# Patient Record
Sex: MALE | Race: WHITE | NOT HISPANIC OR LATINO | Employment: OTHER | URBAN - METROPOLITAN AREA
[De-identification: names, ages, dates, MRNs, and addresses within clinical notes are randomized per-mention and may not be internally consistent; named-entity substitution may affect disease eponyms.]

---

## 2024-04-01 ENCOUNTER — HOSPITAL ENCOUNTER (OUTPATIENT)
Dept: CARDIOLOGY | Facility: CLINIC | Age: 79
Discharge: HOME | End: 2024-04-01
Payer: MEDICARE

## 2024-04-01 DIAGNOSIS — Z95.0 PRESENCE OF CARDIAC PACEMAKER: ICD-10-CM

## 2024-04-01 DIAGNOSIS — I48.91 UNSPECIFIED ATRIAL FIBRILLATION (MULTI): ICD-10-CM

## 2024-04-01 DIAGNOSIS — I48.92 UNSPECIFIED ATRIAL FLUTTER (MULTI): ICD-10-CM

## 2024-04-01 PROCEDURE — 93296 REM INTERROG EVL PM/IDS: CPT

## 2024-11-04 ENCOUNTER — APPOINTMENT (OUTPATIENT)
Dept: CARDIOLOGY | Facility: HOSPITAL | Age: 79
End: 2024-11-04
Payer: MEDICARE

## 2024-12-02 ENCOUNTER — APPOINTMENT (OUTPATIENT)
Dept: CARDIOLOGY | Facility: HOSPITAL | Age: 79
End: 2024-12-02
Payer: MEDICARE

## 2024-12-03 ENCOUNTER — DOCUMENTATION (OUTPATIENT)
Dept: PHYSICAL THERAPY | Facility: HOSPITAL | Age: 79
End: 2024-12-03
Payer: MEDICARE

## 2024-12-03 ENCOUNTER — APPOINTMENT (OUTPATIENT)
Dept: PHYSICAL THERAPY | Facility: HOSPITAL | Age: 79
End: 2024-12-03
Payer: MEDICARE

## 2024-12-03 NOTE — PROGRESS NOTES
Physical Therapy                 Therapy Communication Note    Patient Name: Seferino Blackwood  MRN: [id number]  Department:   Room: Room/bed info not found  Today's Date: 12/3/2024     Discipline: Physical Therapy    Missed Visit Reason:      Missed Time: Cancel    Comment:

## 2024-12-05 ENCOUNTER — APPOINTMENT (OUTPATIENT)
Dept: PHYSICAL THERAPY | Facility: HOSPITAL | Age: 79
End: 2024-12-05
Payer: MEDICARE

## 2024-12-10 ENCOUNTER — EVALUATION (OUTPATIENT)
Dept: PHYSICAL THERAPY | Facility: HOSPITAL | Age: 79
End: 2024-12-10
Payer: OTHER GOVERNMENT

## 2024-12-10 DIAGNOSIS — R26.89 OTHER ABNORMALITIES OF GAIT AND MOBILITY: ICD-10-CM

## 2024-12-10 DIAGNOSIS — R53.1 WEAKNESS GENERALIZED: Primary | ICD-10-CM

## 2024-12-10 PROCEDURE — 97162 PT EVAL MOD COMPLEX 30 MIN: CPT | Mod: GP

## 2024-12-10 ASSESSMENT — PAIN SCALES - GENERAL: PAINLEVEL_OUTOF10: 0 - NO PAIN

## 2024-12-10 ASSESSMENT — ENCOUNTER SYMPTOMS
LOSS OF SENSATION IN FEET: 0
DEPRESSION: 0
OCCASIONAL FEELINGS OF UNSTEADINESS: 1

## 2024-12-10 ASSESSMENT — PATIENT HEALTH QUESTIONNAIRE - PHQ9
2. FEELING DOWN, DEPRESSED OR HOPELESS: NOT AT ALL
SUM OF ALL RESPONSES TO PHQ9 QUESTIONS 1 AND 2: 0
1. LITTLE INTEREST OR PLEASURE IN DOING THINGS: NOT AT ALL

## 2024-12-10 ASSESSMENT — COLUMBIA-SUICIDE SEVERITY RATING SCALE - C-SSRS
2. HAVE YOU ACTUALLY HAD ANY THOUGHTS OF KILLING YOURSELF?: NO
1. IN THE PAST MONTH, HAVE YOU WISHED YOU WERE DEAD OR WISHED YOU COULD GO TO SLEEP AND NOT WAKE UP?: NO
6. HAVE YOU EVER DONE ANYTHING, STARTED TO DO ANYTHING, OR PREPARED TO DO ANYTHING TO END YOUR LIFE?: NO

## 2024-12-10 ASSESSMENT — PAIN - FUNCTIONAL ASSESSMENT: PAIN_FUNCTIONAL_ASSESSMENT: 0-10

## 2024-12-10 NOTE — PROGRESS NOTES
Physical Therapy  Physical Therapy Evaluation    Patient Name: Seferino Blackwood  MRN: [id number]  Encounter Date: 12/10/2024  Time Calculation  Start Time: 1345  Stop Time: 1430  Time Calculation (min): 45 min    PT Evaluation Time Entry  PT Evaluation (Moderate) Time Entry: 45     Insurance:  Visit number: 1 of 15  Authorization info: 15 approved 11/21/2024-4/2/2025  Payor: [facility] HEALTH / Plan: [facility] / Product Type: *No Product type* /     Current Problem  Problem List Items Addressed This Visit             ICD-10-CM    Weakness generalized - Primary R53.1     Other Visit Diagnoses         Codes    Other abnormalities of gait and mobility     R26.89          1. Weakness generalized        2. Other abnormalities of gait and mobility  Referral to Physical Therapy        General:  General  Reason for Referral: Eval and treat 2/2 post acute care hospitalization weakness.  Referred By: Maryuri Castellanos    Precautions:   Precautions  STEADI Fall Risk Score (The score of 4 or more indicates an increased risk of falling): 4  Medical Precautions: No known precautions/limitation    Medical History Form: Reviewed (scanned into chart)    Subjective:   Subjective   Chief Complaint: Patient presents to clinic with deconditioning of lengthy hospitalization.  Onset Date: 11/8/2024  FIDENCIO: Post hospitalization deconditioning.    Current Condition:   Same    Pain:  Pain Assessment: 0-10  0-10 (Numeric) Pain Score: 0 - No pain    Relevant Information (PMH & Previous Tests/Imaging): No  Previous Interventions/Treatments: None    Prior Level of Function (PLOF)  Patient previously independent with all ADLs  Exercise/Physical Activity: likes to walk  Work/School: retired  Hobbies: not mentioned.    Patients Living Environment: Reviewed and no concern    Primary Language: English    Patient's Goal(s) for Therapy: Get strength back.    Red Flags: Do you have any of the following?  No  Fever/chills, unexplained weight changes, dizziness/fainting, unexplained change in bowel or bladder functions, unexplained malaise or muscle weakness, night pain/sweats, numbness or tingling    Objective:  Objective         ROM    Hip AROM (Degrees)      (R)  (L)  Flexion: 120  120   Extension: 10  8    Abduction: 18  20        Knee AROM (Degrees)    WFL       Strength Testing    Core/Abdominals: poor    Hip      (R)  (L)  Flexion: 4/5  4/5     Extension: 3+/5  3+/5    Abduction: 3+/5  3+/5      Knee    (R)  (L)  Flexion: 4/5  4/5      Extension: 4/5  4/5     Ankle    (R)  (L)  Plantarflexion: 4/5  4/5      Dorsiflexion: 4/5  4/5              Functional Screening    Lower Extremity Functional Movements  Transfers: Modified Independent  Gait: none  Assistive Device: no device  DL Squat: to standard chair height  Balance  Feet Together: >20 sec  SLS: <5 sec bilaterally    Flexibility: mild HS tightness      Posture: decreased lumbar lordosis    Outcome Measures:  AMPAC: 40     EDUCATION:   Individual(s) Educated: Patient  Education Provided: Home exercise program, plan of care, activity modifications, pain management, and injury pathology  Handout(s) Provided: Scanned into chart  Home Program: See below treatment  Risk and Benefits Discussed with Patient/Caregiver/Other: Yes   Patient/Caregiver Demonstrated Understanding: Yes   Plan of Care Discussed and Agreed Upon: Yes   Patient Response to Education: Patient/Caregiver verbalized understanding of information    Assessment: Patient presents with signs and symptoms consistent with deconditioning, resulting in limited participation in pain-free ADLs and inability to perform at their prior level of function. Skilled PT warranted to address the above stated impairments, so the patient can perform FA's without increased pain or difficulty.    Clinical Presentation: Stable and/or uncomplicated characteristics    Complexity: low    Plan:  Treatment/Interventions:  Education/ Instruction, Gait training, Neuromuscular re-education, Therapeutic activities, Therapeutic exercises  PT Plan: Skilled PT  PT Frequency: 2 times per week  Duration: 7 wks  Certification Period Start Date: 11/21/24  Certification Period End Date: 04/02/25  Number of Treatments Authorized: 15  Rehab Potential: Good  Plan of Care Agreement: Patient    Goals: Set and discussed today  Active       PT Problem       The patient will improve their performance in the 5 time sit to stand test to < 15 to demonstrate increased balance and LE strength required for safe functional transfers and gait.        Start:  12/10/24    Expected End:  01/28/25            The patient will decrease their time in the timed up and go to <10 seconds to demonstrate improved balance and functional ambulatory ability.        Start:  12/10/24    Expected End:  01/28/25            Increase hip girdle strength to >=4/5       Start:  12/10/24    Expected End:  01/28/25            The patient will demonstrate full understanding and competent performance of their home exercise program to maintain or potentially further improve their presenting condition.        Start:  12/10/24    Expected End:  01/28/25            Improve AMPAC by >10 points       Start:  12/10/24    Expected End:  01/28/25            Patient Stated Goal 1       Start:  12/10/24    Expected End:  01/28/25       Climb steps better             Plan of care was developed with input and agreement by the patient      Treatment Performed: Instructed in the following HEP activities.    Access Code: [email or web address]  URL: [email or web address]  Date: 12/10/2024  Prepared by: Bassam Lloyd    Exercises  - Supine Bridge  - 1 x daily - 7 x weekly - 3 sets - 10 reps  - Sidelying Hip Abduction  - 1 x daily - 7 x weekly - 3 sets - 10 reps  - Prone Hip Extension  - 1 x daily - 7 x weekly - 3 sets - 10 reps  - Squat with Chair Touch  - 1 x daily - 7 x weekly - 3 sets - 4  jessica Lloyd, PT        Screening  Frequency  Date Last Completed   Spiritual and Cultural Beliefs   Screening  each visit or episode of care 12/10/2024   Falls Risk Screening  every ambulatory visit 12/10/2024  2:04 PM   Pain Screening  annually at primary care visit     Domestic Violence screening  annually at primary care visit    Elder Abuse Screening  annually at primary care visit    Depression Screening  annually in the primary care setting 12/10/2024   Suicide Risk Screening  annually in the primary care setting 12/10/2024   Nutrition and Food Insecurity   Screening  at least annually at primary care visit     Key Learner  annually in the primary care setting 12/10/2024   Drug Screen     Alcohol Screen     Advance Directive

## 2024-12-10 NOTE — LETTER
December 10, 2024    Maryuri Castellanos DO  [address]  [place] [address]    Patient: Seferino Blackwood   YOB: 1945   Date of Visit: 12/10/2024       Dear Maryuri Castellanos DO  [address] [place] [address]    The attached plan of care is being sent to you because your patient’s medical reimbursement requires that you certify the plan of care. Your signature is required to allow uninterrupted insurance coverage.      You may indicate your approval by signing below and faxing this form back to us at Dept Fax: [phone].    Please call Dept: [phone] with any questions or concerns.    Thank you for this referral,        Bassam Lloyd PT  [facility]  [address] [id number] [phone]    Payer: [facility] / Plan: [facility] NETWORK / Product Type: *No Product type* /                                                                         Date:     Dear Bassam Lloyd PT,     Re: Mr. Seferino Blackwood, MRN:[id number]    I certify that I have reviewed the attached plan of care and it is medically necessary for Mr. Seferino Blackwood (1945) who is under my care.          ______________________________________                    _________________  Provider name and credentials                                           Date and time                                                                                           Plan of Care 12/10/24   Effective from: 12/10/2024  Effective to: 1/28/2025    Plan ID: [id number]            Participants as of Finalize on 12/10/2024    Name Type Comments Contact Info    Maryuri Castellanos DO Referring Provider  [phone]    Bassam Lloyd PT Physical Therapist  [phone]       Last Plan Note     Author: Bassam Lloyd PT Status: Incomplete Last edited: 12/10/2024  1:45 PM         Physical Therapy  Physical Therapy  Evaluation    Patient Name: Seferino Blackwood  MRN: [id number]  Encounter Date: 12/10/2024  Time Calculation  Start Time: 1345  Stop Time: 1430  Time Calculation (min): 45 min    PT Evaluation Time Entry  PT Evaluation (Moderate) Time Entry: 45     Insurance:  Visit number: 1 of 15  Authorization info: 15 approved 11/21/2024-4/2/2025  [facility] / Plan: [facility] / Product Type: *No Product type* /     Current Problem  Problem List Items Addressed This Visit             ICD-10-CM    Weakness generalized - Primary R53.1     Other Visit Diagnoses         Codes    Other abnormalities of gait and mobility     R26.89          1. Weakness generalized        2. Other abnormalities of gait and mobility  Referral to Physical Therapy        General:  General  Reason for Referral: Eval and treat 2/2 post acute care hospitalization weakness.  Referred By: Maryuri Castellanos    Precautions:   Precautions  STEADI Fall Risk Score (The score of 4 or more indicates an increased risk of falling): 4  Medical Precautions: No known precautions/limitation    Medical History Form: Reviewed (scanned into chart)    Subjective:   Subjective  Chief Complaint: Patient presents to clinic with deconditioning of lengthy hospitalization.  Onset Date: 11/8/2024  FIDENCIO: Post hospitalization deconditioning.    Current Condition:   Same    Pain:  Pain Assessment: 0-10  0-10 (Numeric) Pain Score: 0 - No pain    Relevant Information (PMH & Previous Tests/Imaging): No  Previous Interventions/Treatments: None    Prior Level of Function (PLOF)  Patient previously independent with all ADLs  Exercise/Physical Activity: likes to walk  Work/School: retired  Hobbies: not mentioned.    Patients Living Environment: Reviewed and no concern    Primary Language: English    Patient's Goal(s) for Therapy: Get strength back.    Red Flags: Do you have any of the following? No  Fever/chills, unexplained weight changes,  dizziness/fainting, unexplained change in bowel or bladder functions, unexplained malaise or muscle weakness, night pain/sweats, numbness or tingling    Objective:  Objective        ROM    Hip AROM (Degrees)      (R)  (L)  Flexion: 120  120   Extension: 10  8    Abduction: 18  20        Knee AROM (Degrees)    WFL       Strength Testing    Core/Abdominals: poor    Hip      (R)  (L)  Flexion: 4/5  4/5     Extension: 3+/5  3+/5    Abduction: 3+/5  3+/5      Knee    (R)  (L)  Flexion: 4/5  4/5      Extension: 4/5  4/5     Ankle    (R)  (L)  Plantarflexion: 4/5  4/5      Dorsiflexion: 4/5  4/5              Functional Screening    Lower Extremity Functional Movements  Transfers: Modified Independent  Gait: none  Assistive Device: no device  DL Squat: to standard chair height  Balance  Feet Together: >20 sec  SLS: <5 sec bilaterally    Flexibility: mild HS tightness      Posture: decreased lumbar lordosis    Outcome Measures:  AMPAC: 40     EDUCATION:   Individual(s) Educated: Patient  Education Provided: Home exercise program, plan of care, activity modifications, pain management, and injury pathology  Handout(s) Provided: Scanned into chart  Home Program: See below treatment  Risk and Benefits Discussed with Patient/Caregiver/Other: Yes   Patient/Caregiver Demonstrated Understanding: Yes   Plan of Care Discussed and Agreed Upon: Yes   Patient Response to Education: Patient/Caregiver verbalized understanding of information    Assessment: Patient presents with signs and symptoms consistent with deconditioning, resulting in limited participation in pain-free ADLs and inability to perform at their prior level of function. Skilled PT warranted to address the above stated impairments, so the patient can perform FA's without increased pain or difficulty.    Clinical Presentation: Stable and/or uncomplicated characteristics    Complexity: low    Plan:  Treatment/Interventions: Education/ Instruction, Gait training, Neuromuscular  re-education, Therapeutic activities, Therapeutic exercises  PT Plan: Skilled PT  PT Frequency: 2 times per week  Duration: 7 wks  Certification Period Start Date: 11/21/24  Certification Period End Date: 04/02/25  Number of Treatments Authorized: 15  Rehab Potential: Good  Plan of Care Agreement: Patient    Goals: Set and discussed today  Active       PT Problem       The patient will improve their performance in the 5 time sit to stand test to < 15 to demonstrate increased balance and LE strength required for safe functional transfers and gait.        Start:  12/10/24    Expected End:  01/28/25            The patient will decrease their time in the timed up and go to <10 seconds to demonstrate improved balance and functional ambulatory ability.        Start:  12/10/24    Expected End:  01/28/25            Increase hip girdle strength to >=4/5       Start:  12/10/24    Expected End:  01/28/25            The patient will demonstrate full understanding and competent performance of their home exercise program to maintain or potentially further improve their presenting condition.        Start:  12/10/24    Expected End:  01/28/25            Improve AMPAC by >10 points       Start:  12/10/24    Expected End:  01/28/25            Patient Stated Goal 1       Start:  12/10/24    Expected End:  01/28/25       Climb steps better             Plan of care was developed with input and agreement by the patient      Treatment Performed: Instructed in the following HEP activities.    Access Code: [email or web address]  URL: [email or web address]  Date: 12/10/2024  Prepared by: Bassam Lloyd    Exercises  - Supine Bridge  - 1 x daily - 7 x weekly - 3 sets - 10 reps  - Sidelying Hip Abduction  - 1 x daily - 7 x weekly - 3 sets - 10 reps  - Prone Hip Extension  - 1 x daily - 7 x weekly - 3 sets - 10 reps  - Squat with Chair Touch  - 1 x daily - 7 x weekly - 3 sets - 4 reps  Bassam Lloyd, PT        Screening  Frequency   Date Last Completed   Spiritual and Cultural Beliefs   Screening  each visit or episode of care 12/10/2024   Falls Risk Screening  every ambulatory visit 12/10/2024  2:04 PM   Pain Screening  annually at primary care visit     Domestic Violence screening  annually at primary care visit    Elder Abuse Screening  annually at primary care visit    Depression Screening  annually in the primary care setting 12/10/2024   Suicide Risk Screening  annually in the primary care setting 12/10/2024   Nutrition and Food Insecurity   Screening  at least annually at primary care visit     Key Learner  annually in the primary care setting 12/10/2024   Drug Screen     Alcohol Screen     Advance Directive                 Current Participants as of 12/10/2024    Name Type Comments Contact Info    Maryuri Castellanos DO Referring Provider  [phone]    Signature pending    Bassam Lloyd, PT Physical Therapist  [phone]    Electronically signed by Bassam Lloyd, PT at 12/10/2024 1641 EST

## 2024-12-10 NOTE — Clinical Note
December 10, 2024    Bassam Lloyd, PT  [address]  Rehab Services  [facility] [address]    Patient: Seferino lBackwood   YOB: 1945   Date of Visit: 12/10/2024       Dear Maryuri Castellanos DO  [address] [place] [address]    The attached plan of care is being sent to you because your patient’s medical reimbursement requires that you certify the plan of care. Your signature is required to allow uninterrupted insurance coverage.      You may indicate your approval by signing below and faxing this form back to us at Dept Fax: [phone].    Please call Dept: [phone] with any questions or concerns.    Thank you for this referral,        Bassam Lloyd PT  [facility]  [address] [facility] [address]  [phone]    Payer: Payor: [facility] HEALTH / Plan: [facility] / Product Type: *No Product type* /                                                                         Date:     Dear Bassam Lloyd PT,     Re: Mr. Seferino Blackwood, MRN:[id number]    I certify that I have reviewed the attached plan of care and it is medically necessary for Mr. Seefrino Blackwood (1945) who is under my care.          ______________________________________                    _________________  Provider name and credentials                                           Date and time                                                                                           Plan of Care 12/10/24   Effective from: 12/10/2024  Effective to: 1/28/2025    Plan ID: [id number]            Participants as of Finalize on 12/10/2024    Name Type Comments Contact Info    Maryuri Castellanos DO Referring Provider  [phone]    Bassam Lloyd PT Physical Therapist  [phone]       Last Plan Note     Author: Bassam Lloyd PT Status: Incomplete Last edited: 12/10/2024  1:45 PM         Physical Therapy  Physical Therapy  Evaluation    Patient Name: Seferino Blackwood  MRN: [id number]  Encounter Date: 12/10/2024  Time Calculation  Start Time: 1345  Stop Time: 1430  Time Calculation (min): 45 min    PT Evaluation Time Entry  PT Evaluation (Moderate) Time Entry: 45     Insurance:  Visit number: 1 of 15  Authorization info: 15 approved 11/21/2024-4/2/2025  [facility] / Plan: [facility] / Product Type: *No Product type* /     Current Problem  Problem List Items Addressed This Visit             ICD-10-CM    Weakness generalized - Primary R53.1     Other Visit Diagnoses         Codes    Other abnormalities of gait and mobility     R26.89          1. Weakness generalized        2. Other abnormalities of gait and mobility  Referral to Physical Therapy        General:  General  Reason for Referral: Eval and treat 2/2 post acute care hospitalization weakness.  Referred By: Maryuri Castellanos    Precautions:   Precautions  STEADI Fall Risk Score (The score of 4 or more indicates an increased risk of falling): 4  Medical Precautions: No known precautions/limitation    Medical History Form: Reviewed (scanned into chart)    Subjective:   Subjective  Chief Complaint: Patient presents to clinic with deconditioning of lengthy hospitalization.  Onset Date: 11/8/2024  FIDENCIO: Post hospitalization deconditioning.    Current Condition:   Same    Pain:  Pain Assessment: 0-10  0-10 (Numeric) Pain Score: 0 - No pain    Relevant Information (PMH & Previous Tests/Imaging): No  Previous Interventions/Treatments: None    Prior Level of Function (PLOF)  Patient previously independent with all ADLs  Exercise/Physical Activity: likes to walk  Work/School: retired  Hobbies: not mentioned.    Patients Living Environment: Reviewed and no concern    Primary Language: English    Patient's Goal(s) for Therapy: Get strength back.    Red Flags: Do you have any of the following? No  Fever/chills, unexplained weight changes,  dizziness/fainting, unexplained change in bowel or bladder functions, unexplained malaise or muscle weakness, night pain/sweats, numbness or tingling    Objective:  Objective        ROM    Hip AROM (Degrees)      (R)  (L)  Flexion: 120  120   Extension: 10  8    Abduction: 18  20        Knee AROM (Degrees)    WFL       Strength Testing    Core/Abdominals: poor    Hip      (R)  (L)  Flexion: 4/5  4/5     Extension: 3+/5  3+/5    Abduction: 3+/5  3+/5      Knee    (R)  (L)  Flexion: 4/5  4/5      Extension: 4/5  4/5     Ankle    (R)  (L)  Plantarflexion: 4/5  4/5      Dorsiflexion: 4/5  4/5              Functional Screening    Lower Extremity Functional Movements  Transfers: Modified Independent  Gait: none  Assistive Device: no device  DL Squat: to standard chair height  Balance  Feet Together: >20 sec  SLS: <5 sec bilaterally    Flexibility: mild HS tightness      Posture: decreased lumbar lordosis    Outcome Measures:  AMPAC: 40     EDUCATION:   Individual(s) Educated: Patient  Education Provided: Home exercise program, plan of care, activity modifications, pain management, and injury pathology  Handout(s) Provided: Scanned into chart  Home Program: See below treatment  Risk and Benefits Discussed with Patient/Caregiver/Other: Yes   Patient/Caregiver Demonstrated Understanding: Yes   Plan of Care Discussed and Agreed Upon: Yes   Patient Response to Education: Patient/Caregiver verbalized understanding of information    Assessment: Patient presents with signs and symptoms consistent with deconditioning, resulting in limited participation in pain-free ADLs and inability to perform at their prior level of function. Skilled PT warranted to address the above stated impairments, so the patient can perform FA's without increased pain or difficulty.    Clinical Presentation: Stable and/or uncomplicated characteristics    Complexity: low    Plan:  Treatment/Interventions: Education/ Instruction, Gait training, Neuromuscular  re-education, Therapeutic activities, Therapeutic exercises  PT Plan: Skilled PT  PT Frequency: 2 times per week  Duration: 7 wks  Certification Period Start Date: 11/21/24  Certification Period End Date: 04/02/25  Number of Treatments Authorized: 15  Rehab Potential: Good  Plan of Care Agreement: Patient    Goals: Set and discussed today  Active       PT Problem       The patient will improve their performance in the 5 time sit to stand test to < 15 to demonstrate increased balance and LE strength required for safe functional transfers and gait.        Start:  12/10/24    Expected End:  01/28/25            The patient will decrease their time in the timed up and go to <10 seconds to demonstrate improved balance and functional ambulatory ability.        Start:  12/10/24    Expected End:  01/28/25            Increase hip girdle strength to >=4/5       Start:  12/10/24    Expected End:  01/28/25            The patient will demonstrate full understanding and competent performance of their home exercise program to maintain or potentially further improve their presenting condition.        Start:  12/10/24    Expected End:  01/28/25            Improve AMPAC by >10 points       Start:  12/10/24    Expected End:  01/28/25            Patient Stated Goal 1       Start:  12/10/24    Expected End:  01/28/25       Climb steps better             Plan of care was developed with input and agreement by the patient      Treatment Performed: Instructed in the following HEP activities.    Access Code: [email or web address]  URL: [email or web address]  Date: 12/10/2024  Prepared by: Bassam Lloyd    Exercises  - Supine Bridge  - 1 x daily - 7 x weekly - 3 sets - 10 reps  - Sidelying Hip Abduction  - 1 x daily - 7 x weekly - 3 sets - 10 reps  - Prone Hip Extension  - 1 x daily - 7 x weekly - 3 sets - 10 reps  - Squat with Chair Touch  - 1 x daily - 7 x weekly - 3 sets - 4 reps  Bassam Lloyd, PT        Screening  Frequency   Date Last Completed   Spiritual and Cultural Beliefs   Screening  each visit or episode of care 12/10/2024   Falls Risk Screening  every ambulatory visit 12/10/2024  2:04 PM   Pain Screening  annually at primary care visit     Domestic Violence screening  annually at primary care visit    Elder Abuse Screening  annually at primary care visit    Depression Screening  annually in the primary care setting 12/10/2024   Suicide Risk Screening  annually in the primary care setting 12/10/2024   Nutrition and Food Insecurity   Screening  at least annually at primary care visit     Key Learner  annually in the primary care setting 12/10/2024   Drug Screen     Alcohol Screen     Advance Directive                 Current Participants as of 12/10/2024    Name Type Comments Contact Info    Maryuri Castellanos DO Referring Provider  [phone]    Signature pending    Bassam Lloyd, PT Physical Therapist  [phone]    Electronically signed by Bassam Lloyd, PT at 12/10/2024 1641 EST

## 2024-12-12 ENCOUNTER — TREATMENT (OUTPATIENT)
Dept: PHYSICAL THERAPY | Facility: HOSPITAL | Age: 79
End: 2024-12-12
Payer: OTHER GOVERNMENT

## 2024-12-12 DIAGNOSIS — R53.1 WEAKNESS GENERALIZED: ICD-10-CM

## 2024-12-12 PROCEDURE — 97110 THERAPEUTIC EXERCISES: CPT | Mod: GP

## 2024-12-12 ASSESSMENT — PAIN - FUNCTIONAL ASSESSMENT: PAIN_FUNCTIONAL_ASSESSMENT: 0-10

## 2024-12-12 ASSESSMENT — PAIN SCALES - GENERAL: PAINLEVEL_OUTOF10: 0 - NO PAIN

## 2024-12-12 NOTE — PROGRESS NOTES
Physical Therapy Treatment    Patient Name: Seferino Blackwood  MRN: [id number]  Today's Date: 12/12/2024  Payor: [facility] HEALTH / Plan: [facility] / Product Type: *No Product type* /   Time Calculation  Start Time: 1347  Stop Time: 1430  Time Calculation (min): 43 min      PT Therapeutic Procedures Time Entry  Therapeutic Exercise Time Entry: 43     Current Problem  1. Weakness generalized  Follow Up In Physical Therapy        Problem List Items Addressed This Visit             ICD-10-CM    Weakness generalized R53.1     General  Reason for Referral: Eval and treat 2/2 post acute care hospitalization weakness.  Referred By: Maryuri Castellanos    Subjective   Current Condition:   Same  Patient reports he has been working on HEP and is still finding it somewhat difficulty.    Performing HEP?: Yes    Precautions  Precautions  Medical Precautions: No known precautions/limitation    Pain  Pain Assessment: 0-10  0-10 (Numeric) Pain Score: 0 - No pain    Objective     Treatments:  Therapeutic Exercise  Therapeutic Exercise Performed: Yes  Therapeutic Exercise Activity 1: Physiostep x10 @ 20 W  Therapeutic Exercise Activity 2: Austin hip extension 3x10  Therapeutic Exercise Activity 3: Austin back extension 3x5  Therapeutic Exercise Activity 4: hip abductor [id number]  Therapeutic Exercise Activity 5: Calf raise in // bars 3x10  Therapeutic Exercise Activity 6: corner squat 3x5  Therapeutic Exercise Activity 7: TM 5 min @1.5    EDUCATION:   Individual(s) Educated: Patient  Education Provided: Discussed modifications to program to ensure progression.  Handout(s) Provided: Scanned into chart  Home Program: in place  Risk and Benefits Discussed with Patient/Caregiver/Other: Yes   Patient/Caregiver Demonstrated Understanding: Yes   Patient Response to Education: Patient/Caregiver verbalized understanding of information    Assessment: Tolerated advancing clinical program with strategic  rest periods.     Plan: Continue with POC. Progress as tolerated.  OP PT Plan  Treatment/Interventions: Education/ Instruction, Gait training, Neuromuscular re-education, Therapeutic activities, Therapeutic exercises  PT Plan: Skilled PT  PT Frequency: 2 times per week  Duration: 7 wks  Certification Period Start Date: 11/21/24  Certification Period End Date: 04/02/25  Number of Treatments Authorized: 2 of15  Rehab Potential: Good  Plan of Care Agreement: Patient    Goals:  Active       PT Problem       The patient will improve their performance in the 5 time sit to stand test to < 15 to demonstrate increased balance and LE strength required for safe functional transfers and gait.        Start:  12/10/24    Expected End:  01/28/25            The patient will decrease their time in the timed up and go to <10 seconds to demonstrate improved balance and functional ambulatory ability.        Start:  12/10/24    Expected End:  01/28/25            Increase hip girdle strength to >=4/5       Start:  12/10/24    Expected End:  01/28/25            The patient will demonstrate full understanding and competent performance of their home exercise program to maintain or potentially further improve their presenting condition.        Start:  12/10/24    Expected End:  01/28/25            Improve AMPAC by >10 points       Start:  12/10/24    Expected End:  01/28/25            Patient Stated Goal 1       Start:  12/10/24    Expected End:  01/28/25       Climb steps better              Bassam Lloyd, PT

## 2024-12-17 ENCOUNTER — APPOINTMENT (OUTPATIENT)
Dept: PHYSICAL THERAPY | Facility: HOSPITAL | Age: 79
End: 2024-12-17
Payer: OTHER GOVERNMENT

## 2024-12-19 ENCOUNTER — TREATMENT (OUTPATIENT)
Dept: PHYSICAL THERAPY | Facility: HOSPITAL | Age: 79
End: 2024-12-19
Payer: OTHER GOVERNMENT

## 2024-12-19 DIAGNOSIS — R53.1 WEAKNESS GENERALIZED: ICD-10-CM

## 2024-12-19 PROCEDURE — 97110 THERAPEUTIC EXERCISES: CPT | Mod: GP,CQ

## 2024-12-19 ASSESSMENT — PAIN - FUNCTIONAL ASSESSMENT: PAIN_FUNCTIONAL_ASSESSMENT: 0-10

## 2024-12-19 ASSESSMENT — PAIN SCALES - GENERAL: PAINLEVEL_OUTOF10: 0 - NO PAIN

## 2024-12-19 NOTE — PROGRESS NOTES
Physical Therapy Treatment    Patient Name: Seferino Blackwood  MRN: [id number]  Encounter Date: 12/19/2024  Time Calculation  Start Time: 1340  Stop Time: 1427  Time Calculation (min): 47 min        PT Therapeutic Procedures Time Entry  Therapeutic Exercise Time Entry: 47     Current Problem  Problem List Items Addressed This Visit             ICD-10-CM    Weakness generalized R53.1     1. Weakness generalized  Follow Up In Physical Therapy          General  Reason for Referral: Generalized weakness  Referred By: Maryuri Castellanos  Past Medical History Relevant to Rehab: see chart  Preferred Learning Style: written  General Comment: Pt reports feeling good today, no pain just cont to feel weak and overall struggling with activities up on feet or up and down steps.    Subjective   Current Condition:   Same  Patient reports feeling good overall, however cont to report weakness and difficulty with longer distance gait and up and down steps.  Reports able to stand and take shower with less SOB and reduced seated rest breaks needed.    Performing HEP?: Yes    Precautions  Precautions  Medical Precautions: No known precautions/limitation, Fall precautions  Pain  Pain Assessment: 0-10  0-10 (Numeric) Pain Score: 0 - No pain    Objective     Treatments:    Therapeutic Exercise  Therapeutic Exercise Performed: Yes  Therapeutic Exercise Activity 1: Physiostep x10 @ 20 W  Therapeutic Exercise Activity 2: Austin hip extension 3x10  Therapeutic Exercise Activity 3: Austin back extension 3x5  Therapeutic Exercise Activity 4: hip abductor [id number]  Therapeutic Exercise Activity 5: 2x20 #3 LAQ BLE  Therapeutic Exercise Activity 6: 8 x 6 ft // resisted side step YTB  Therapeutic Exercise Activity 7: 3x5 corner squats         EDUCATION:   Individual(s) Educated: Patient  Education Provided: same as previous- current HEP causes good level of fatigue   Handout(s) Provided: Scanned into chart  Home Program: Same as previous- increase  reps as tolerated.   Risk and Benefits Discussed with Patient/Caregiver/Other: Yes   Patient/Caregiver Demonstrated Understanding: Yes   Patient Response to Education: Patient/Caregiver verbalized understanding of information, Patient/Caregiver performed return demonstration of exercises/activities, and Patient/Caregiver asked appropriate questions    Assessment: Pt required verbal cues to aid in TKE control during prone hip ext and reduced pelvic rotation to target weak muscles.  Pt able to tolerate increased reps of challenges including no breaks needed between squats and sidestepping challenges with some SOB reported.        Plan: Continue with POC. Continue with core stability, LE strengthening, ROM, flexibility, and balance, so the patient can perform FA's without increased pain or difficulty.  Cont with focus on quad, hip and pelvic stability to aid in overall function and reduce risk for falls.    OP PT Plan  Treatment/Interventions: Education/ Instruction, Gait training, Neuromuscular re-education, Therapeutic activities, Therapeutic exercises  PT Plan: Skilled PT  PT Frequency: 2 times per week  Duration: 7 wks  Certification Period Start Date: 11/21/24  Certification Period End Date: 04/02/25  Number of Treatments Authorized: 2 of 15  Rehab Potential: Good  Plan of Care Agreement: Patient  Payor: [facility] HEALTH / Plan: [facility] / Product Type: *No Product type* /     Visit count this episode: 3 visits    Goals:  Active       PT Problem       The patient will improve their performance in the 5 time sit to stand test to < 15 to demonstrate increased balance and LE strength required for safe functional transfers and gait.  (Progressing)       Start:  12/10/24    Expected End:  01/28/25            The patient will decrease their time in the timed up and go to <10 seconds to demonstrate improved balance and functional ambulatory ability.  (Progressing)       Start:   12/10/24    Expected End:  01/28/25            Increase hip girdle strength to >=4/5 (Progressing)       Start:  12/10/24    Expected End:  01/28/25            The patient will demonstrate full understanding and competent performance of their home exercise program to maintain or potentially further improve their presenting condition.  (Progressing)       Start:  12/10/24    Expected End:  01/28/25            Improve AMPAC by >10 points (Progressing)       Start:  12/10/24    Expected End:  01/28/25            Patient Stated Goal 1 (Progressing)       Start:  12/10/24    Expected End:  01/28/25       Climb steps better              Kayleen Choi, PTA

## 2024-12-23 ENCOUNTER — TREATMENT (OUTPATIENT)
Dept: PHYSICAL THERAPY | Facility: HOSPITAL | Age: 79
End: 2024-12-23
Payer: OTHER GOVERNMENT

## 2024-12-23 DIAGNOSIS — R53.1 WEAKNESS GENERALIZED: ICD-10-CM

## 2024-12-23 PROCEDURE — 97110 THERAPEUTIC EXERCISES: CPT | Mod: GP,CQ

## 2024-12-23 ASSESSMENT — PAIN SCALES - GENERAL: PAINLEVEL_OUTOF10: 0 - NO PAIN

## 2024-12-23 ASSESSMENT — PAIN - FUNCTIONAL ASSESSMENT: PAIN_FUNCTIONAL_ASSESSMENT: 0-10

## 2024-12-23 NOTE — PROGRESS NOTES
Physical Therapy Treatment    Patient Name: Seferino Blackwood  MRN: [id number]  Today's Date: 12/23/2024  Time Calculation  Start Time: 1300  Stop Time: 1342  Time Calculation (min): 42 min        PT Therapeutic Procedures Time Entry  Therapeutic Exercise Time Entry: 42                Insurance:  Visit number: 4 of 15  Authorization info: 15 approved  Insurance Type: [facility]  Certification Period Start Date: 11/21/24  Certification Period End Date: 04/02/25      Current Problem  1. Weakness generalized  Follow Up In Physical Therapy          General  Reason for Referral: Generalized weakness  Referred By: Maryuri Castellanos      Subjective   Current Condition:   Better  Patient reports nothing new to add. Feeling a little bit stronger.     Performing HEP?: Yes    Precautions  Precautions  Medical Precautions: No known precautions/limitation, Fall precautions  Pain  Pain Assessment: 0-10  0-10 (Numeric) Pain Score: 0 - No pain    Objective     Fair tolerance to additional standing interventions to work on endurance     Treatments:    Therapeutic Exercise  Therapeutic Exercise Performed: Yes  Therapeutic Exercise Activity 1: Physiostep x10 @ 20 W  Therapeutic Exercise Activity 2: Austin hip extension 3x10  Therapeutic Exercise Activity 3: standing alt hip abd 2x10  Therapeutic Exercise Activity 4: hip abductor [id number]  Therapeutic Exercise Activity 5: standing alt marches 2x10  Therapeutic Exercise Activity 6: 8 x 6 ft // resisted side step YTB  Therapeutic Exercise Activity 7: 3x5 corner squats  Therapeutic Exercise Activity 8: standing calf raises 2x10       EDUCATION:   Individual(s) Educated: Patient   Education Provided: Techniques/Body Mechanics   Handout(s) Provided: Scanned into chart  Home Program: In previous notes  Risk and Benefits Discussed with Patient/Caregiver/Other: Yes   Patient/Caregiver Demonstrated Understanding: Yes   Patient Response to Education: Patient/Caregiver  verbalized understanding of information and Patient/Caregiver performed return demonstration of exercises/activities    Assessment:   Patient tolerated session well today, making small improvements in overall endurance and strength.         Plan:  Continue with POC and as per patient tolerated to improve ability to complete functional tasks. Potentially increase resistance band with lateral side stepping to increase challenge.   Frequency: 2 x Week  Duration: 7 weeks        Goals:  Active       PT Problem       The patient will improve their performance in the 5 time sit to stand test to < 15 to demonstrate increased balance and LE strength required for safe functional transfers and gait.  (Progressing)       Start:  12/10/24    Expected End:  01/28/25            The patient will decrease their time in the timed up and go to <10 seconds to demonstrate improved balance and functional ambulatory ability.  (Progressing)       Start:  12/10/24    Expected End:  01/28/25            Increase hip girdle strength to >=4/5 (Progressing)       Start:  12/10/24    Expected End:  01/28/25            The patient will demonstrate full understanding and competent performance of their home exercise program to maintain or potentially further improve their presenting condition.  (Progressing)       Start:  12/10/24    Expected End:  01/28/25            Improve AMPAC by >10 points (Progressing)       Start:  12/10/24    Expected End:  01/28/25            Patient Stated Goal 1 (Progressing)       Start:  12/10/24    Expected End:  01/28/25       Climb steps better              Nicci Soto, PTA

## 2024-12-26 ENCOUNTER — TREATMENT (OUTPATIENT)
Dept: PHYSICAL THERAPY | Facility: HOSPITAL | Age: 79
End: 2024-12-26
Payer: OTHER GOVERNMENT

## 2024-12-26 DIAGNOSIS — R53.1 WEAKNESS GENERALIZED: ICD-10-CM

## 2024-12-26 PROCEDURE — 97112 NEUROMUSCULAR REEDUCATION: CPT | Mod: GP

## 2024-12-26 PROCEDURE — 97110 THERAPEUTIC EXERCISES: CPT | Mod: GP

## 2024-12-26 ASSESSMENT — PAIN - FUNCTIONAL ASSESSMENT: PAIN_FUNCTIONAL_ASSESSMENT: 0-10

## 2024-12-26 ASSESSMENT — PAIN SCALES - GENERAL: PAINLEVEL_OUTOF10: 0 - NO PAIN

## 2024-12-26 NOTE — PROGRESS NOTES
Physical Therapy Treatment    Patient Name: Seferino Blackwood  MRN: [id number]  Today's Date: 12/26/2024  Payor: [facility] / Plan: [facility] NETWORK / Product Type: *No Product type* /   Time Calculation  Start Time: 1345  Stop Time: 1428  Time Calculation (min): 43 min      PT Therapeutic Procedures Time Entry  Neuromuscular Re-Education Time Entry: 7  Therapeutic Exercise Time Entry: 36   Current Problem  1. Weakness generalized  Follow Up In Physical Therapy        Problem List Items Addressed This Visit             ICD-10-CM    Weakness generalized R53.1     General  Reason for Referral: Generalized weakness  Referred By: Maryuri Castellanos    Subjective   Current Condition:   Better  Patient reports he feels he is getting better/stronger slowly.    Performing HEP?: Yes    Precautions  Precautions  Medical Precautions: No known precautions/limitation, Fall precautions    Pain  Pain Assessment: 0-10  0-10 (Numeric) Pain Score: 0 - No pain    Objective     Treatments:  Therapeutic Exercise  Therapeutic Exercise Performed: Yes  Therapeutic Exercise Activity 1: Physiostep x10 @ 20 W  Therapeutic Exercise Activity 2: [place] hip extension 3x10  Therapeutic Exercise Activity 3: standing alt hip abd 3x10  Therapeutic Exercise Activity 4: hip abductor [id number]  Therapeutic Exercise Activity 5: standing alt marches 3x10  Therapeutic Exercise Activity 7: 3x5 corner squats  Therapeutic Exercise Activity 8: standing calf raises 2x10    Balance/Neuromuscular Re-Education  Balance/Neuromuscular Re-Education Activity 1: Ladder drills: lateral 2 passes  Balance/Neuromuscular Re-Education Activity 2: lateral/drop pattern 4 passes.    EDUCATION:   Individual(s) Educated: Patient  Education Provided: Discussed role of strength/balance and agility work in rehab.  Handout(s) Provided: Scanned into chart  Home Program: yes  Risk and Benefits Discussed with Patient/Caregiver/Other: Yes    Patient/Caregiver Demonstrated Understanding: Yes   Patient Response to Education: Patient/Caregiver verbalized understanding of information    Assessment: Progressing. Challenged by agility work.     Plan: Continue with POC.   OP PT Plan  Treatment/Interventions: Education/ Instruction, Gait training, Neuromuscular re-education, Therapeutic activities, Therapeutic exercises  PT Plan: Skilled PT  PT Frequency: 2 times per week  Duration: 7 wks  Certification Period Start Date: 11/21/24  Certification Period End Date: 04/02/25  Number of Treatments Authorized: 3 of 15  Rehab Potential: Good  Plan of Care Agreement: Patient    Goals:  Active       PT Problem       The patient will improve their performance in the 5 time sit to stand test to < 15 to demonstrate increased balance and LE strength required for safe functional transfers and gait.  (Progressing)       Start:  12/10/24    Expected End:  01/28/25            The patient will decrease their time in the timed up and go to <10 seconds to demonstrate improved balance and functional ambulatory ability.  (Progressing)       Start:  12/10/24    Expected End:  01/28/25            Increase hip girdle strength to >=4/5 (Progressing)       Start:  12/10/24    Expected End:  01/28/25            The patient will demonstrate full understanding and competent performance of their home exercise program to maintain or potentially further improve their presenting condition.  (Progressing)       Start:  12/10/24    Expected End:  01/28/25            Improve AMPAC by >10 points (Progressing)       Start:  12/10/24    Expected End:  01/28/25            Patient Stated Goal 1 (Progressing)       Start:  12/10/24    Expected End:  01/28/25       Climb steps better              Bassam Lloyd, PT

## 2024-12-31 ENCOUNTER — TREATMENT (OUTPATIENT)
Dept: PHYSICAL THERAPY | Facility: HOSPITAL | Age: 79
End: 2024-12-31
Payer: OTHER GOVERNMENT

## 2024-12-31 DIAGNOSIS — R53.1 WEAKNESS GENERALIZED: ICD-10-CM

## 2024-12-31 PROCEDURE — 97112 NEUROMUSCULAR REEDUCATION: CPT | Mod: GP

## 2024-12-31 PROCEDURE — 97110 THERAPEUTIC EXERCISES: CPT | Mod: GP

## 2024-12-31 ASSESSMENT — PAIN SCALES - GENERAL: PAINLEVEL_OUTOF10: 0 - NO PAIN

## 2024-12-31 NOTE — PROGRESS NOTES
Physical Therapy Treatment    Patient Name: Seferino Blackwood  MRN: [id number]  Today's Date: 12/31/2024  [facility] / Plan: [facility] NETWORK / Product Type: *No Product type* /   Time Calculation  Start Time: 1345  Stop Time: 1430  Time Calculation (min): 45 min      PT Therapeutic Procedures Time Entry  Neuromuscular Re-Education Time Entry: 18  Therapeutic Exercise Time Entry: 27     Current Problem  1. Weakness generalized  Follow Up In Physical Therapy        Problem List Items Addressed This Visit             ICD-10-CM    Weakness generalized R53.1     General  Reason for Referral: Generalized weakness  Referred By: Maryuri Castellanos    Subjective   Current Condition:   Better  Patient reports he has more endurance and is able to take showers now.    Performing HEP?: Yes    Precautions  Precautions  Medical Precautions: No known precautions/limitation, Fall precautions  Pain  0-10 (Numeric) Pain Score: 0 - No pain    Objective     Treatments:  Therapeutic Exercise  Therapeutic Exercise Performed: Yes  Therapeutic Exercise Activity 1: Physiostep x10 @ 20 W  Therapeutic Exercise Activity 2: [place] hip extension 3x10  Therapeutic Exercise Activity 4: hip abductor [id number]#  Therapeutic Exercise Activity 7: 3x5 corner squats  Therapeutic Exercise Activity 8: standing calf raises 2x10    Balance/Neuromuscular Re-Education  Balance/Neuromuscular Re-Education Activity Performed: Yes (following 4 exercises as circuit.)  Balance/Neuromuscular Re-Education Activity 1: Ladder drills: lateral 4 passes  Balance/Neuromuscular Re-Education Activity 2: lateral/drop pattern 4 passes.  Balance/Neuromuscular Re-Education Activity 3: standing alternate march 2x10  Balance/Neuromuscular Re-Education Activity 4: sstanding alternate abduction    EDUCATION:   Home Program: In place  Risk and Benefits Discussed with Patient/Caregiver/Other: Yes   Patient/Caregiver Demonstrated  Understanding: Yes   Patient Response to Education: Patient/Caregiver verbalized understanding of information    Assessment: Slow steady progress toward goals continues.     Plan: Continue with POC.     OP PT Plan  Treatment/Interventions: Education/ Instruction, Gait training, Neuromuscular re-education, Therapeutic activities, Therapeutic exercises  PT Plan: Skilled PT  PT Frequency: 2 times per week  Duration: 7 wks  Certification Period Start Date: 11/21/24  Certification Period End Date: 04/02/25  Number of Treatments Authorized: 4 of 15  Rehab Potential: Good  Plan of Care Agreement: Patient    Goals:  Active       PT Problem       The patient will improve their performance in the 5 time sit to stand test to < 15 to demonstrate increased balance and LE strength required for safe functional transfers and gait.  (Progressing)       Start:  12/10/24    Expected End:  01/28/25            The patient will decrease their time in the timed up and go to <10 seconds to demonstrate improved balance and functional ambulatory ability.  (Progressing)       Start:  12/10/24    Expected End:  01/28/25            Increase hip girdle strength to >=4/5 (Progressing)       Start:  12/10/24    Expected End:  01/28/25            The patient will demonstrate full understanding and competent performance of their home exercise program to maintain or potentially further improve their presenting condition.  (Progressing)       Start:  12/10/24    Expected End:  01/28/25            Improve AMPAC by >10 points (Progressing)       Start:  12/10/24    Expected End:  01/28/25            Patient Stated Goal 1 (Progressing)       Start:  12/10/24    Expected End:  01/28/25       Climb steps better              Bassam Lloyd, PT

## 2025-01-02 ENCOUNTER — TREATMENT (OUTPATIENT)
Dept: PHYSICAL THERAPY | Facility: HOSPITAL | Age: 80
End: 2025-01-02
Payer: OTHER GOVERNMENT

## 2025-01-02 DIAGNOSIS — R53.1 WEAKNESS GENERALIZED: ICD-10-CM

## 2025-01-02 PROCEDURE — 97112 NEUROMUSCULAR REEDUCATION: CPT | Mod: GP

## 2025-01-02 PROCEDURE — 97110 THERAPEUTIC EXERCISES: CPT | Mod: GP

## 2025-01-02 ASSESSMENT — PAIN SCALES - GENERAL: PAINLEVEL_OUTOF10: 0 - NO PAIN

## 2025-01-02 NOTE — PROGRESS NOTES
Physical Therapy Treatment    Patient Name: Seferino Blackwood  MRN: [id number]  Today's Date: 1/2/2025  Payor: [facility] HEALTH / Plan: [facility] NETWORK / Product Type: *No Product type* /   Time Calculation  Start Time: 1345  Stop Time: 1427  Time Calculation (min): 42 min      PT Therapeutic Procedures Time Entry  Neuromuscular Re-Education Time Entry: 17  Therapeutic Exercise Time Entry: 25     Current Problem  1. Weakness generalized  Follow Up In Physical Therapy        Problem List Items Addressed This Visit             ICD-10-CM    Weakness generalized R53.1       General  Reason for Referral: Generalized weakness  Referred By: Maryuri Castellanos    Subjective   Current Condition:   Better  Patient reports he feels he is improving, but slowly.    Performing HEP?: Yes    Precautions  Precautions  Medical Precautions: No known precautions/limitation, Fall precautions  Pain  0-10 (Numeric) Pain Score: 0 - No pain    Objective     Treatments:    Therapeutic Exercise  Therapeutic Exercise Activity 1: Physiostep x10 @ 20 W  Therapeutic Exercise Activity 2: hip thrusters [id number]  Therapeutic Exercise Activity 3: hip abductor [id number]#  Therapeutic Exercise Activity 4: corner squat 3x8x12#    Balance/Neuromuscular Re-Education  Balance/Neuromuscular Re-Education Activity Performed: Yes  Balance/Neuromuscular Re-Education Activity 1: circuits: lateral lateral drop drill with each of the following: calf raise/high march/alternate abduction.    EDUCATION:   Individual(s) Educated: Patient  Education Provided: Discussed programing as related to volume/intensity.  Handout(s) Provided: Scanned into chart  Home Program: yes  Risk and Benefits Discussed with Patient/Caregiver/Other: Yes   Patient/Caregiver Demonstrated Understanding: Yes   Patient Response to Education: Patient/Caregiver verbalized understanding of information    Assessment: Increasing volume very slowly 2/2 patients limited  tolerance.     Plan: Continue with POC.     OP PT Plan  Treatment/Interventions: Education/ Instruction, Gait training, Neuromuscular re-education, Therapeutic activities, Therapeutic exercises  PT Plan: Skilled PT  PT Frequency: 2 times per week  Duration: 7 wks  Certification Period Start Date: 11/21/24  Certification Period End Date: 04/02/25  Number of Treatments Authorized: 5 of 15  Rehab Potential: Good  Plan of Care Agreement: Patient    Goals:  Active       PT Problem       The patient will improve their performance in the 5 time sit to stand test to < 15 to demonstrate increased balance and LE strength required for safe functional transfers and gait.  (Progressing)       Start:  12/10/24    Expected End:  01/28/25            The patient will decrease their time in the timed up and go to <10 seconds to demonstrate improved balance and functional ambulatory ability.  (Progressing)       Start:  12/10/24    Expected End:  01/28/25            Increase hip girdle strength to >=4/5 (Progressing)       Start:  12/10/24    Expected End:  01/28/25            The patient will demonstrate full understanding and competent performance of their home exercise program to maintain or potentially further improve their presenting condition.  (Progressing)       Start:  12/10/24    Expected End:  01/28/25            Improve AMPAC by >10 points (Progressing)       Start:  12/10/24    Expected End:  01/28/25            Patient Stated Goal 1 (Progressing)       Start:  12/10/24    Expected End:  01/28/25       Climb steps better              Bassam Lloyd, PT

## 2025-01-06 ENCOUNTER — HOSPITAL ENCOUNTER (OUTPATIENT)
Dept: CARDIOLOGY | Facility: CLINIC | Age: 80
Discharge: HOME | End: 2025-01-06
Payer: MEDICARE

## 2025-01-06 DIAGNOSIS — I48.92 UNSPECIFIED ATRIAL FLUTTER (MULTI): ICD-10-CM

## 2025-01-06 DIAGNOSIS — I48.91 UNSPECIFIED ATRIAL FIBRILLATION (MULTI): ICD-10-CM

## 2025-01-06 DIAGNOSIS — Z95.0 PRESENCE OF CARDIAC PACEMAKER: ICD-10-CM

## 2025-01-06 PROCEDURE — 93296 REM INTERROG EVL PM/IDS: CPT

## 2025-01-07 ENCOUNTER — TREATMENT (OUTPATIENT)
Dept: PHYSICAL THERAPY | Facility: HOSPITAL | Age: 80
End: 2025-01-07
Payer: OTHER GOVERNMENT

## 2025-01-07 DIAGNOSIS — R53.1 WEAKNESS GENERALIZED: ICD-10-CM

## 2025-01-07 PROCEDURE — 97110 THERAPEUTIC EXERCISES: CPT | Mod: GP

## 2025-01-07 PROCEDURE — 97112 NEUROMUSCULAR REEDUCATION: CPT | Mod: GP

## 2025-01-07 ASSESSMENT — PAIN SCALES - GENERAL: PAINLEVEL_OUTOF10: 0 - NO PAIN

## 2025-01-07 NOTE — PROGRESS NOTES
Physical Therapy Treatment    Patient Name: Seferino Blackwood  MRN: [id number]  Today's Date: 1/7/2025  [facility] / Plan: [facility] NETWORK / Product Type: *No Product type* /   Time Calculation  Start Time: 1345  Stop Time: 1429  Time Calculation (min): 44 min      PT Therapeutic Procedures Time Entry  Neuromuscular Re-Education Time Entry: 14  Therapeutic Exercise Time Entry: 30     Current Problem  1. Weakness generalized  Follow Up In Physical Therapy        Problem List Items Addressed This Visit             ICD-10-CM    Weakness generalized R53.1     General  Reason for Referral: Generalized weakness  Referred By: Maryuri Castellanos    Subjective   Current Condition:   Better  Patient reports he feels he is slowly improving in stamina.    Performing HEP?: Yes    Precautions  Precautions  Medical Precautions: No known precautions/limitation, Fall precautions    Pain  0-10 (Numeric) Pain Score: 0 - No pain    Objective     Treatments:  Therapeutic Exercise  Therapeutic Exercise Activity 1: Physiostep x10 @ 20 W  Therapeutic Exercise Activity 2: hip thrusters ([facility]) [id number]  Therapeutic Exercise Activity 3: hip abductor [id number]#  Therapeutic Exercise Activity 4: corner squat 3x8x12#  Therapeutic Exercise Activity 5: calf raise 3x10    Balance/Neuromuscular Re-Education  Balance/Neuromuscular Re-Education Activity Performed: Yes  Balance/Neuromuscular Re-Education Activity 1: 4 square step drill 10 reps  Balance/Neuromuscular Re-Education Activity 2: d1/d2 lifts with tandem stance and close spotting 10 times each direction    EDUCATION:   Individual(s) Educated: Patient  Education Provided: discussed role of balance and aerobic fitness in overall health.  Handout(s) Provided: Scanned into chart  Home Program: yes  Risk and Benefits Discussed with Patient/Caregiver/Other: Yes   Patient/Caregiver Demonstrated Understanding: Yes   Patient Response to Education:  Patient/Caregiver verbalized understanding of information    Assessment: Continues to make steady progress.     Plan: Continue with POC.     OP PT Plan  Treatment/Interventions: Education/ Instruction, Gait training, Neuromuscular re-education, Therapeutic activities, Therapeutic exercises  PT Plan: Skilled PT  PT Frequency: 2 times per week  Duration: 7 wks  Certification Period Start Date: 11/21/24  Certification Period End Date: 04/02/25  Number of Treatments Authorized: 6 of 15  Rehab Potential: Good  Plan of Care Agreement: Patient    Goals:  Active       PT Problem       The patient will improve their performance in the 5 time sit to stand test to < 15 to demonstrate increased balance and LE strength required for safe functional transfers and gait.  (Progressing)       Start:  12/10/24    Expected End:  01/28/25            The patient will decrease their time in the timed up and go to <10 seconds to demonstrate improved balance and functional ambulatory ability.  (Progressing)       Start:  12/10/24    Expected End:  01/28/25            Increase hip girdle strength to >=4/5 (Progressing)       Start:  12/10/24    Expected End:  01/28/25            The patient will demonstrate full understanding and competent performance of their home exercise program to maintain or potentially further improve their presenting condition.  (Progressing)       Start:  12/10/24    Expected End:  01/28/25            Improve AMPAC by >10 points (Progressing)       Start:  12/10/24    Expected End:  01/28/25            Patient Stated Goal 1 (Progressing)       Start:  12/10/24    Expected End:  01/28/25       Climb steps better              Bassam Lloyd, PT

## 2025-01-09 ENCOUNTER — TREATMENT (OUTPATIENT)
Dept: PHYSICAL THERAPY | Facility: HOSPITAL | Age: 80
End: 2025-01-09
Payer: OTHER GOVERNMENT

## 2025-01-09 DIAGNOSIS — R53.1 WEAKNESS GENERALIZED: ICD-10-CM

## 2025-01-09 PROCEDURE — 97112 NEUROMUSCULAR REEDUCATION: CPT | Mod: GP

## 2025-01-09 PROCEDURE — 97110 THERAPEUTIC EXERCISES: CPT | Mod: GP

## 2025-01-09 ASSESSMENT — PAIN SCALES - GENERAL: PAINLEVEL_OUTOF10: 0 - NO PAIN

## 2025-01-09 NOTE — PROGRESS NOTES
Physical Therapy Treatment    Patient Name: Seferino Blackwood  MRN: [id number]  Today's Date: 1/9/2025  Payor: [facility] HEALTH / Plan: [facility] / Product Type: *No Product type* /   Time Calculation  Start Time: 1346  Stop Time: 1430  Time Calculation (min): 44 min      PT Therapeutic Procedures Time Entry  Neuromuscular Re-Education Time Entry: 8  Therapeutic Exercise Time Entry: 36     Current Problem  1. Weakness generalized  Follow Up In Physical Therapy        Problem List Items Addressed This Visit             ICD-10-CM    Weakness generalized R53.1     General  Reason for Referral: Generalized weakness  Referred By: Maryuri Castellanos    Subjective   Current Condition:   Better  Patient reports he feels he is gaining strength and endurance since beginning PT.    Performing HEP?: Yes    Precautions  Precautions  Medical Precautions: No known precautions/limitation, Fall precautions  Pain  0-10 (Numeric) Pain Score: 0 - No pain    Objective     Treatments:  Therapeutic Exercise  Therapeutic Exercise Activity 1: Physiostep x10 @ 20 W  Therapeutic Exercise Activity 2: hip thrusters ([facility]) [id number]  Therapeutic Exercise Activity 3: hip abductor [id number]#  Therapeutic Exercise Activity 4: corner squat 3x8x12#  Therapeutic Exercise Activity 5: calf raise 3x10    Balance/Neuromuscular Re-Education  Balance/Neuromuscular Re-Education Activity 1: step up to balance holds 3x10 each    EDUCATION:   Individual(s) Educated: Patient  Education Provided: Discussed roles of strength, balance and endurance in rehab process.  Handout(s) Provided: Scanned into chart  Home Program: In place  Risk and Benefits Discussed with Patient/Caregiver/Other: Yes   Patient/Caregiver Demonstrated Understanding: Yes   Patient Response to Education: Patient/Caregiver verbalized understanding of information    Assessment: Making steady gains toward goals.     Plan: Continue with POC.     OP PT  Plan  Treatment/Interventions: Education/ Instruction, Gait training, Neuromuscular re-education, Therapeutic activities, Therapeutic exercises  PT Plan: Skilled PT  PT Frequency: 2 times per week  Duration: 7 wks  Certification Period Start Date: 11/21/24  Certification Period End Date: 04/02/25  Number of Treatments Authorized: 7 of 15  Rehab Potential: Good  Plan of Care Agreement: Patient    Goals:  Active       PT Problem       The patient will improve their performance in the 5 time sit to stand test to < 15 to demonstrate increased balance and LE strength required for safe functional transfers and gait.  (Progressing)       Start:  12/10/24    Expected End:  01/28/25            The patient will decrease their time in the timed up and go to <10 seconds to demonstrate improved balance and functional ambulatory ability.  (Progressing)       Start:  12/10/24    Expected End:  01/28/25            Increase hip girdle strength to >=4/5 (Progressing)       Start:  12/10/24    Expected End:  01/28/25            The patient will demonstrate full understanding and competent performance of their home exercise program to maintain or potentially further improve their presenting condition.  (Progressing)       Start:  12/10/24    Expected End:  01/28/25            Improve AMPAC by >10 points (Progressing)       Start:  12/10/24    Expected End:  01/28/25            Patient Stated Goal 1 (Progressing)       Start:  12/10/24    Expected End:  01/28/25       Climb steps better              Bassam Lloyd, PT

## 2025-01-14 ENCOUNTER — TREATMENT (OUTPATIENT)
Dept: PHYSICAL THERAPY | Facility: HOSPITAL | Age: 80
End: 2025-01-14
Payer: OTHER GOVERNMENT

## 2025-01-14 DIAGNOSIS — R53.1 WEAKNESS GENERALIZED: ICD-10-CM

## 2025-01-14 PROCEDURE — 97110 THERAPEUTIC EXERCISES: CPT | Mod: GP

## 2025-01-14 PROCEDURE — 97112 NEUROMUSCULAR REEDUCATION: CPT | Mod: GP

## 2025-01-14 ASSESSMENT — PAIN SCALES - GENERAL: PAINLEVEL_OUTOF10: 0 - NO PAIN

## 2025-01-14 NOTE — PROGRESS NOTES
Physical Therapy Treatment    Patient Name: Seferino Blackwood  MRN: [id number]  Today's Date: 1/14/2025  Payor: [facility] HEALTH / Plan: [facility] NETWORK / Product Type: *No Product type* /   Time Calculation  Start Time: 1345  Stop Time: 1430  Time Calculation (min): 45 min      PT Therapeutic Procedures Time Entry  Neuromuscular Re-Education Time Entry: 16  Therapeutic Exercise Time Entry: 29     Current Problem  1. Weakness generalized  Follow Up In Physical Therapy        Problem List Items Addressed This Visit             ICD-10-CM    Weakness generalized R53.1     General  Reason for Referral: Generalized weakness  Referred By: Maryuri Castellanos  Past Medical History Relevant to Rehab: see chart    Subjective   Current Condition:   Better  Patient reports he is definitely getting stronger but still has LBP on occasion which limits his activities. States that he did not sleep well last night and that is affecting his endurance today.    Performing HEP?: Yes    Precautions  Precautions  Medical Precautions: No known precautions/limitation, Fall precautions  Pain  0-10 (Numeric) Pain Score: 0 - No pain    Objective     Treatments:  Therapeutic Exercise  Therapeutic Exercise Activity 1: Physiostep x10 @ 20 W  Therapeutic Exercise Activity 2: hip thrusters ([facility]) [id number]  Therapeutic Exercise Activity 3: hip abductor [id number]#  Therapeutic Exercise Activity 4: corner squat 3x10 (lowest setting)  Therapeutic Exercise Activity 5: calf raise 3x10    Balance/Neuromuscular Re-Education  Balance/Neuromuscular Re-Education Activity Performed: Yes  Balance/Neuromuscular Re-Education Activity 1: step up to balance holds 3x10x5+airex each  Balance/Neuromuscular Re-Education Activity 2: d1/d2 lifts with tandem stance and close spotting 10 times each direction    Assessment: Slow steady gains continue.     Plan: Continue with POC.     OP PT Plan  Treatment/Interventions: Education/ Instruction,  Gait training, Neuromuscular re-education, Therapeutic activities, Therapeutic exercises  PT Plan: Skilled PT  PT Frequency: 2 times per week  Duration: 7 wks  Certification Period Start Date: 11/21/24  Certification Period End Date: 04/02/25  Number of Treatments Authorized: 8 of 15  Rehab Potential: Good  Plan of Care Agreement: Patient    Goals:  Active       PT Problem       The patient will improve their performance in the 5 time sit to stand test to < 15 to demonstrate increased balance and LE strength required for safe functional transfers and gait.  (Progressing)       Start:  12/10/24    Expected End:  01/28/25            The patient will decrease their time in the timed up and go to <10 seconds to demonstrate improved balance and functional ambulatory ability.  (Progressing)       Start:  12/10/24    Expected End:  01/28/25            Increase hip girdle strength to >=4/5 (Progressing)       Start:  12/10/24    Expected End:  01/28/25            The patient will demonstrate full understanding and competent performance of their home exercise program to maintain or potentially further improve their presenting condition.  (Progressing)       Start:  12/10/24    Expected End:  01/28/25            Improve AMPAC by >10 points (Progressing)       Start:  12/10/24    Expected End:  01/28/25            Patient Stated Goal 1 (Progressing)       Start:  12/10/24    Expected End:  01/28/25       Climb steps better              Bassam Lloyd, PT

## 2025-01-16 ENCOUNTER — TREATMENT (OUTPATIENT)
Dept: PHYSICAL THERAPY | Facility: HOSPITAL | Age: 80
End: 2025-01-16
Payer: OTHER GOVERNMENT

## 2025-01-16 DIAGNOSIS — R53.1 WEAKNESS GENERALIZED: ICD-10-CM

## 2025-01-16 PROCEDURE — 97110 THERAPEUTIC EXERCISES: CPT | Mod: GP

## 2025-01-16 PROCEDURE — 97112 NEUROMUSCULAR REEDUCATION: CPT | Mod: GP

## 2025-01-16 ASSESSMENT — PAIN SCALES - GENERAL: PAINLEVEL_OUTOF10: 0 - NO PAIN

## 2025-01-16 NOTE — PROGRESS NOTES
Physical Therapy Treatment    Patient Name: Seferino Blackwood  MRN: [id number]  Today's Date: 1/16/2025  Payor: [facility] HEALTH / Plan: [facility] NETWORK / Product Type: *No Product type* /   Time Calculation  Start Time: 1345  Stop Time: 1430  Time Calculation (min): 45 min     PT Therapeutic Procedures Time Entry  Neuromuscular Re-Education Time Entry: 14  Therapeutic Exercise Time Entry: 31     Current Problem  1. Weakness generalized  Follow Up In Physical Therapy        Problem List Items Addressed This Visit             ICD-10-CM    Weakness generalized R53.1     General  Reason for Referral: Generalized weakness  Referred By: Maryuri Castellanos  Past Medical History Relevant to Rehab: see chart    Subjective   Current Condition:   Better  Patient reports he got more sleep and is more energetic today.    Performing HEP?: Yes    Precautions  Precautions  Medical Precautions: No known precautions/limitation, Fall precautions  Pain  0-10 (Numeric) Pain Score: 0 - No pain    Objective     Treatments:  Therapeutic Exercise  Therapeutic Exercise Activity 1: Physiostep x10 @ 20 W  Therapeutic Exercise Activity 2: hip thrusters (BOSU) [id number]+12#  Therapeutic Exercise Activity 3: hip abductor 0l56x08#  Therapeutic Exercise Activity 4: corner squat 3x10 (lowest setting)  Therapeutic Exercise Activity 5: calf raise 3x10    Balance/Neuromuscular Re-Education  Balance/Neuromuscular Re-Education Activity Performed: Yes  Balance/Neuromuscular Re-Education Activity 1: v step ups with wall assist 2x10  Balance/Neuromuscular Re-Education Activity 2: d1/d2 lifts in tandem stance 2x10    Assessment: Slow steady progress toward goals  continues.     Plan: Continue with POC.     OP PT Plan  Treatment/Interventions: Education/ Instruction, Gait training, Neuromuscular re-education, Therapeutic activities, Therapeutic exercises  PT Plan: Skilled PT  PT Frequency: 2 times per week  Duration: 7  wks  Certification Period Start Date: 11/21/24  Certification Period End Date: 04/02/25  Number of Treatments Authorized: 9 of 15  Rehab Potential: Good  Plan of Care Agreement: Patient    Goals:  Active       PT Problem       The patient will improve their performance in the 5 time sit to stand test to < 15 to demonstrate increased balance and LE strength required for safe functional transfers and gait.  (Progressing)       Start:  12/10/24    Expected End:  01/28/25            The patient will decrease their time in the timed up and go to <10 seconds to demonstrate improved balance and functional ambulatory ability.  (Progressing)       Start:  12/10/24    Expected End:  01/28/25            Increase hip girdle strength to >=4/5 (Progressing)       Start:  12/10/24    Expected End:  01/28/25            The patient will demonstrate full understanding and competent performance of their home exercise program to maintain or potentially further improve their presenting condition.  (Progressing)       Start:  12/10/24    Expected End:  01/28/25            Improve AMPAC by >10 points (Progressing)       Start:  12/10/24    Expected End:  01/28/25            Patient Stated Goal 1 (Progressing)       Start:  12/10/24    Expected End:  01/28/25       Climb steps better              Bassam Lloyd, PT

## 2025-01-21 ENCOUNTER — TREATMENT (OUTPATIENT)
Dept: PHYSICAL THERAPY | Facility: HOSPITAL | Age: 80
End: 2025-01-21
Payer: OTHER GOVERNMENT

## 2025-01-21 DIAGNOSIS — R53.1 WEAKNESS GENERALIZED: ICD-10-CM

## 2025-01-21 PROCEDURE — 97110 THERAPEUTIC EXERCISES: CPT | Mod: GP

## 2025-01-21 PROCEDURE — 97112 NEUROMUSCULAR REEDUCATION: CPT | Mod: GP

## 2025-01-21 ASSESSMENT — PAIN SCALES - GENERAL: PAINLEVEL_OUTOF10: 0 - NO PAIN

## 2025-01-21 NOTE — PROGRESS NOTES
Physical Therapy Treatment    Patient Name: Seferino Blackwood  MRN: [id number]  Today's Date: 1/21/2025  Payor: [facility] HEALTH / Plan: [facility] NETWORK / Product Type: *No Product type* /   Time Calculation  Start Time: 1345  Stop Time: 1424  Time Calculation (min): 39 min      PT Therapeutic Procedures Time Entry  Neuromuscular Re-Education Time Entry: 9  Therapeutic Exercise Time Entry: 30     Current Problem  1. Weakness generalized  Follow Up In Physical Therapy        Problem List Items Addressed This Visit             ICD-10-CM    Weakness generalized R53.1     General  Reason for Referral: Generalized weakness  Referred By: Maryuri Castellanos  Past Medical History Relevant to Rehab: see chart    Subjective   Current Condition:   Better  Patient reports he is feeling somewhat stronger today.    Performing HEP?: Yes    Precautions  Precautions  Medical Precautions: No known precautions/limitation, Fall precautions  Pain  0-10 (Numeric) Pain Score: 0 - No pain    Objective     Treatments:  Therapeutic Exercise  Therapeutic Exercise Activity 1: Physiostep x10 @ 20 W  Therapeutic Exercise Activity 2: hip thrusters (BOSU) 4z46mOF+12#  Therapeutic Exercise Activity 3: hip abductor [id number]#  Therapeutic Exercise Activity 4: corner squat 3x6 (lowest setting)  Therapeutic Exercise Activity 5: calf raise 3x10    Balance/Neuromuscular Re-Education  Balance/Neuromuscular Re-Education Activity Performed: Yes  Balance/Neuromuscular Re-Education Activity 1: v step ups    Assessment: Some improvement in tolerance of closed chain strengthening over last few sessions.     Plan: Continue with POC.     OP PT Plan  Treatment/Interventions: Education/ Instruction, Gait training, Neuromuscular re-education, Therapeutic activities, Therapeutic exercises  PT Plan: Skilled PT  PT Frequency: 2 times per week  Duration: 7 wks  Certification Period Start Date: 11/21/24  Certification Period End Date:  04/02/25  Number of Treatments Authorized: 10 of 15  Rehab Potential: Good  Plan of Care Agreement: Patient    Goals:  Active       PT Problem       The patient will improve their performance in the 5 time sit to stand test to < 15 to demonstrate increased balance and LE strength required for safe functional transfers and gait.  (Progressing)       Start:  12/10/24    Expected End:  01/28/25            The patient will decrease their time in the timed up and go to <10 seconds to demonstrate improved balance and functional ambulatory ability.  (Progressing)       Start:  12/10/24    Expected End:  01/28/25            Increase hip girdle strength to >=4/5 (Progressing)       Start:  12/10/24    Expected End:  01/28/25            The patient will demonstrate full understanding and competent performance of their home exercise program to maintain or potentially further improve their presenting condition.  (Progressing)       Start:  12/10/24    Expected End:  01/28/25            Improve AMPAC by >10 points (Progressing)       Start:  12/10/24    Expected End:  01/28/25            Patient Stated Goal 1 (Progressing)       Start:  12/10/24    Expected End:  01/28/25       Climb steps better              Bassam Lloyd, PT

## 2025-01-23 ENCOUNTER — TREATMENT (OUTPATIENT)
Dept: PHYSICAL THERAPY | Facility: HOSPITAL | Age: 80
End: 2025-01-23
Payer: OTHER GOVERNMENT

## 2025-01-23 DIAGNOSIS — R53.1 WEAKNESS GENERALIZED: ICD-10-CM

## 2025-01-23 PROCEDURE — 97112 NEUROMUSCULAR REEDUCATION: CPT | Mod: GP

## 2025-01-23 PROCEDURE — 97110 THERAPEUTIC EXERCISES: CPT | Mod: GP

## 2025-01-23 ASSESSMENT — PAIN SCALES - GENERAL: PAINLEVEL_OUTOF10: 0 - NO PAIN

## 2025-01-23 NOTE — PROGRESS NOTES
Physical Therapy Treatment    Patient Name: Seferino Blackwood  MRN: [id number]  Today's Date: 1/23/2025  [facility] / Plan: [facility] NETWORK / Product Type: *No Product type* /   Time Calculation  Start Time: 1345  Stop Time: 1428  Time Calculation (min): 43 min      PT Therapeutic Procedures Time Entry  Neuromuscular Re-Education Time Entry: 11  Therapeutic Exercise Time Entry: 32     Current Problem  1. Weakness generalized  Follow Up In Physical Therapy        Problem List Items Addressed This Visit             ICD-10-CM    Weakness generalized R53.1     General  Reason for Referral: Generalized weakness  Referred By: Maryuri Castellanos  Past Medical History Relevant to Rehab: see chart    Subjective   Current Condition:   Better  Patient reports his endurance is improving.    Performing HEP?: Yes    Precautions  Precautions  Medical Precautions: No known precautions/limitation, Fall precautions    Pain  0-10 (Numeric) Pain Score: 0 - No pain    Objective     Treatments:  Therapeutic Exercise  Therapeutic Exercise Activity 1: Physiostep x10 @ 20 W  Therapeutic Exercise Activity 2: hip thrusters (BOSU) [id number]+12#  Therapeutic Exercise Activity 3: hip abductor [id number]#  Therapeutic Exercise Activity 4: corner squat 3x6x12# (lowest setting)  Therapeutic Exercise Activity 5: calf raise 3x10    Balance/Neuromuscular Re-Education  Balance/Neuromuscular Re-Education Activity Performed: Yes  Balance/Neuromuscular Re-Education Activity 1: v step ups with wall assist 2x10  Balance/Neuromuscular Re-Education Activity 2: d1/d2 lifts in tandem stance 2x10    EDUCATION:   Individual(s) Educated: Patient  Education Provided: Discussed progression to date.  Handout(s) Provided: Scanned into chart  Home Program: In place  Risk and Benefits Discussed with Patient/Caregiver/Other: Yes   Patient/Caregiver Demonstrated Understanding: Yes   Patient Response to Education: Patient/Caregiver  verbalized understanding of information    Assessment: Making good progress on active program.     Plan: Continue with POC.     OP PT Plan  Treatment/Interventions: Education/ Instruction, Gait training, Neuromuscular re-education, Therapeutic activities, Therapeutic exercises  PT Plan: Skilled PT  PT Frequency: 2 times per week  Duration: 7 wks  Certification Period Start Date: 11/21/24  Certification Period End Date: 04/02/25  Number of Treatments Authorized: 11 of 15  Rehab Potential: Good  Plan of Care Agreement: Patient    Goals:  Active       PT Problem       The patient will improve their performance in the 5 time sit to stand test to < 15 to demonstrate increased balance and LE strength required for safe functional transfers and gait.  (Progressing)       Start:  12/10/24    Expected End:  01/28/25            The patient will decrease their time in the timed up and go to <10 seconds to demonstrate improved balance and functional ambulatory ability.  (Progressing)       Start:  12/10/24    Expected End:  01/28/25            Increase hip girdle strength to >=4/5 (Progressing)       Start:  12/10/24    Expected End:  01/28/25            The patient will demonstrate full understanding and competent performance of their home exercise program to maintain or potentially further improve their presenting condition.  (Progressing)       Start:  12/10/24    Expected End:  01/28/25            Improve AMPAC by >10 points (Progressing)       Start:  12/10/24    Expected End:  01/28/25            Patient Stated Goal 1 (Progressing)       Start:  12/10/24    Expected End:  01/28/25       Climb steps better              Bassam Lloyd, PT

## 2025-01-28 ENCOUNTER — TREATMENT (OUTPATIENT)
Dept: PHYSICAL THERAPY | Facility: HOSPITAL | Age: 80
End: 2025-01-28
Payer: OTHER GOVERNMENT

## 2025-01-28 DIAGNOSIS — R53.1 WEAKNESS GENERALIZED: ICD-10-CM

## 2025-01-28 PROCEDURE — 97110 THERAPEUTIC EXERCISES: CPT | Mod: GP

## 2025-01-28 PROCEDURE — 97112 NEUROMUSCULAR REEDUCATION: CPT | Mod: GP

## 2025-01-28 ASSESSMENT — PAIN SCALES - GENERAL: PAINLEVEL_OUTOF10: 0 - NO PAIN

## 2025-01-28 NOTE — PROGRESS NOTES
"  Physical Therapy Treatment    Patient Name: Seferino Blackwood  MRN: [id number]  Today's Date: 1/28/2025  Payor: [facility] HEALTH / Plan: [facility] NETWORK / Product Type: *No Product type* /   Time Calculation  Start Time: 1345  Stop Time: 1430  Time Calculation (min): 45 min      PT Therapeutic Procedures Time Entry  Neuromuscular Re-Education Time Entry: 11  Therapeutic Exercise Time Entry: 34     Current Problem  1. Weakness generalized  Follow Up In Physical Therapy        Problem List Items Addressed This Visit             ICD-10-CM    Weakness generalized R53.1     General  Reason for Referral: Generalized weakness  Referred By: Maryuri Castellanos    Subjective   Current Condition:   Same  Patient reports he is feeling a bit \"under the weather today\".    Performing HEP?: Yes    Precautions  Precautions  Medical Precautions: No known precautions/limitation    Pain  0-10 (Numeric) Pain Score: 0 - No pain    Objective     Treatments:  Therapeutic Exercise  Therapeutic Exercise Activity 1: Physiostep x10 @ 20 W  Therapeutic Exercise Activity 2: hip thrusters (BOSU) [id number]+12#  Therapeutic Exercise Activity 3: hip abductor [id number]#  Therapeutic Exercise Activity 4: corner squat 1w54u57# (lowest setting)  Therapeutic Exercise Activity 5: calf raise 3x10    Balance/Neuromuscular Re-Education  Balance/Neuromuscular Re-Education Activity Performed: Yes  Balance/Neuromuscular Re-Education Activity 1: v step ups with spot only 2x10  Balance/Neuromuscular Re-Education Activity 2: d1/d2 lifts in modified tandem stance 2x10    Assessment: Increased ability to sustain effort in closed chain activities.     Plan: Continue with POC.     OP PT Plan  Treatment/Interventions: Education/ Instruction, Gait training, Neuromuscular re-education, Therapeutic activities, Therapeutic exercises  PT Plan: Skilled PT  PT Frequency: 2 times per week  Duration: 7 wks  Certification Period Start Date: " 11/21/24  Certification Period End Date: 04/02/25  Number of Treatments Authorized: 12 of 15  Rehab Potential: Good  Plan of Care Agreement: Patient    Goals:  Active       PT Problem       The patient will improve their performance in the 5 time sit to stand test to < 15 to demonstrate increased balance and LE strength required for safe functional transfers and gait.  (Progressing)       Start:  12/10/24    Expected End:  01/28/25            The patient will decrease their time in the timed up and go to <10 seconds to demonstrate improved balance and functional ambulatory ability.  (Progressing)       Start:  12/10/24    Expected End:  01/28/25            Increase hip girdle strength to >=4/5 (Progressing)       Start:  12/10/24    Expected End:  01/28/25            The patient will demonstrate full understanding and competent performance of their home exercise program to maintain or potentially further improve their presenting condition.  (Progressing)       Start:  12/10/24    Expected End:  01/28/25            Improve AMPAC by >10 points (Progressing)       Start:  12/10/24    Expected End:  01/28/25            Patient Stated Goal 1 (Progressing)       Start:  12/10/24    Expected End:  01/28/25       Climb steps better              Bassam Lloyd, PT

## 2025-01-30 ENCOUNTER — TREATMENT (OUTPATIENT)
Dept: PHYSICAL THERAPY | Facility: HOSPITAL | Age: 80
End: 2025-01-30
Payer: OTHER GOVERNMENT

## 2025-01-30 DIAGNOSIS — R53.1 WEAKNESS GENERALIZED: ICD-10-CM

## 2025-01-30 PROCEDURE — 97110 THERAPEUTIC EXERCISES: CPT | Mod: GP

## 2025-01-30 PROCEDURE — 97112 NEUROMUSCULAR REEDUCATION: CPT | Mod: GP

## 2025-01-30 ASSESSMENT — PAIN SCALES - GENERAL: PAINLEVEL_OUTOF10: 0 - NO PAIN

## 2025-01-30 ASSESSMENT — PAIN - FUNCTIONAL ASSESSMENT: PAIN_FUNCTIONAL_ASSESSMENT: 0-10

## 2025-01-30 NOTE — PROGRESS NOTES
Physical Therapy Treatment and DC    Patient Name: Seferino Blackwood  MRN: [id number]  Today's Date: 1/30/2025  Payor: [facility] HEALTH / Plan: [facility] NETWORK / Product Type: *No Product type* /   Time Calculation  Start Time: 1345  Stop Time: 1423  Time Calculation (min): 38 min        PT Therapeutic Procedures Time Entry  Neuromuscular Re-Education Time Entry: 8  Therapeutic Exercise Time Entry: 30        Current Problem  1. Weakness generalized  Follow Up In Physical Therapy        Problem List Items Addressed This Visit             ICD-10-CM       Symptoms and Signs    Weakness generalized R53.1       General  Reason for Referral: Generalized weakness  Referred By: Maryuri Castellanos  Past Medical History Relevant to Rehab: see chart    Subjective   Current Condition:   Better  Patient reports feeling good today, no pain. States he feels much better since beginning PT.     Performing HEP?: Yes    Precautions  Precautions  Medical Precautions: No known precautions/limitation  Pain  Pain Assessment: 0-10  0-10 (Numeric) Pain Score: 0 - No pain    Objective      Hip                             (R)                    (L)  Flexion:            5/5                   5/5                                 Extension:        4+/5                 4+/5                   Abduction:       4/5                 4/5         TUG: 10 seconds    5x STS: 11 seconds    Outcome Measures:  AMPAC: 43    Treatments:    Therapeutic Exercise  Therapeutic Exercise Activity 1: Physiostep x10 @ 20 W  Therapeutic Exercise Activity 2: recheck  Therapeutic Exercise Activity 3: HEP as below    Balance/Neuromuscular Re-Education  Balance/Neuromuscular Re-Education Activity 1: recheck  Balance/Neuromuscular Re-Education Activity 2: HEP as below       EDUCATION:   Individual(s) Educated: Patient  Education Provided: HEP, POC  Handout(s) Provided: Scanned into chart  Home Program:     Access Code: [email or web address]  URL:  [email or web address]  Date: 01/30/2025  Prepared by: Nandini Guadalupe    Exercises  - Mini Squat with Counter Support  - 1-2 x daily - 7 x weekly - 2 sets - 10 reps  - Sidestepping  - 1-2 x daily - 7 x weekly - 2 sets - 10 reps  - Standing Marching  - 1-2 x daily - 7 x weekly - 2 sets - 10 reps  - Standing Quadriceps Stretch  - 1-2 x daily - 7 x weekly - 3 reps - 20-30 sex hold  - Single Leg Stance  - 1-2 x daily - 7 x weekly - 3 reps - 30 sec hold    Risk and Benefits Discussed with Patient/Caregiver/Other: Yes   Patient/Caregiver Demonstrated Understanding: Yes   Patient Response to Education: Patient/Caregiver verbalized understanding of information, Patient/Caregiver performed return demonstration of exercises/activities, and Patient/Caregiver asked appropriate questions    Assessment:   Pt has made significant progress since beginning PT. Pt states that he no longer has difficulties w strength or ROM at this time. Pt has met his most relevant goals. D/t pt progress, pt will be DC from OPPT services this date. Pt edu on performing HEP for next few weeks to maintain progress and pt verbalized understanding.       Plan: DC from OPPT  OP PT Plan  Treatment/Interventions: Education/ Instruction, Gait training, Neuromuscular re-education, Therapeutic activities, Therapeutic exercises  PT Plan: Skilled PT  PT Frequency: 2 times per week  Duration: 7 wks  Certification Period Start Date: 11/21/24  Certification Period End Date: 04/02/25  Number of Treatments Authorized: 15 of 15  Rehab Potential: Good  Plan of Care Agreement: Patient    Goals:  Resolved       PT Problem       The patient will improve their performance in the 5 time sit to stand test to < 15 to demonstrate increased balance and LE strength required for safe functional transfers and gait.  (Met)       Start:  12/10/24    Expected End:  01/28/25    Resolved:  01/30/25         The patient will decrease their time in the timed up and  go to <10 seconds to demonstrate improved balance and functional ambulatory ability.  (Met)       Start:  12/10/24    Expected End:  01/28/25    Resolved:  01/30/25         Increase hip girdle strength to >=4/5 (Met)       Start:  12/10/24    Expected End:  01/28/25    Resolved:  01/30/25         The patient will demonstrate full understanding and competent performance of their home exercise program to maintain or potentially further improve their presenting condition.  (Met)       Start:  12/10/24    Expected End:  01/28/25    Resolved:  01/30/25         Improve AMPAC by >10 points (Not met)       Start:  12/10/24    Expected End:  01/28/25    Resolved:  01/30/25         Patient Stated Goal 1 (Met)       Start:  12/10/24    Expected End:  01/28/25    Resolved:  01/30/25    Climb steps better              Nandini Guadalupe, PT

## 2025-04-07 ENCOUNTER — HOSPITAL ENCOUNTER (OUTPATIENT)
Dept: CARDIOLOGY | Facility: CLINIC | Age: 80
Discharge: HOME | End: 2025-04-07
Payer: MEDICARE

## 2025-04-07 DIAGNOSIS — I48.91 UNSPECIFIED ATRIAL FIBRILLATION (MULTI): ICD-10-CM

## 2025-04-07 DIAGNOSIS — Z95.0 PRESENCE OF CARDIAC PACEMAKER: ICD-10-CM

## 2025-04-07 DIAGNOSIS — I48.92 UNSPECIFIED ATRIAL FLUTTER (MULTI): ICD-10-CM

## 2025-04-07 PROCEDURE — 93296 REM INTERROG EVL PM/IDS: CPT

## 2025-05-20 DIAGNOSIS — Z95.0 PACEMAKER: Primary | ICD-10-CM

## 2025-05-20 DIAGNOSIS — I48.92 ATRIAL FIBRILLATION AND FLUTTER: ICD-10-CM

## 2025-05-20 DIAGNOSIS — I48.91 ATRIAL FIBRILLATION AND FLUTTER: ICD-10-CM

## 2025-05-28 ENCOUNTER — HOSPITAL ENCOUNTER (OUTPATIENT)
Dept: CARDIOLOGY | Facility: HOSPITAL | Age: 80
Discharge: HOME | End: 2025-05-28
Payer: MEDICARE

## 2025-05-28 DIAGNOSIS — I44.30 UNSPECIFIED ATRIOVENTRICULAR BLOCK: ICD-10-CM

## 2025-05-28 DIAGNOSIS — I48.92 UNSPECIFIED ATRIAL FLUTTER (MULTI): ICD-10-CM

## 2025-05-28 DIAGNOSIS — Z95.0 PRESENCE OF CARDIAC PACEMAKER: ICD-10-CM

## 2025-05-28 DIAGNOSIS — I48.91 UNSPECIFIED ATRIAL FIBRILLATION (MULTI): ICD-10-CM

## 2025-05-28 PROCEDURE — 93279 PRGRMG DEV EVAL PM/LDLS PM: CPT | Performed by: NURSE PRACTITIONER

## 2025-05-28 PROCEDURE — 93279 PRGRMG DEV EVAL PM/LDLS PM: CPT

## 2025-07-07 ENCOUNTER — HOSPITAL ENCOUNTER (OUTPATIENT)
Dept: CARDIOLOGY | Facility: CLINIC | Age: 80
Discharge: HOME | End: 2025-07-07
Payer: MEDICARE

## 2025-07-07 DIAGNOSIS — Z95.0 PACEMAKER: ICD-10-CM

## 2025-07-07 DIAGNOSIS — I48.92 ATRIAL FIBRILLATION AND FLUTTER: ICD-10-CM

## 2025-07-07 DIAGNOSIS — I48.91 ATRIAL FIBRILLATION AND FLUTTER: ICD-10-CM

## 2025-07-07 PROCEDURE — 93296 REM INTERROG EVL PM/IDS: CPT
